# Patient Record
Sex: FEMALE | Race: BLACK OR AFRICAN AMERICAN
[De-identification: names, ages, dates, MRNs, and addresses within clinical notes are randomized per-mention and may not be internally consistent; named-entity substitution may affect disease eponyms.]

---

## 2020-11-08 ENCOUNTER — HOSPITAL ENCOUNTER (EMERGENCY)
Dept: HOSPITAL 72 - EMR | Age: 30
Discharge: HOME | End: 2020-11-08
Payer: SELF-PAY

## 2020-11-08 VITALS — SYSTOLIC BLOOD PRESSURE: 116 MMHG | DIASTOLIC BLOOD PRESSURE: 72 MMHG

## 2020-11-08 VITALS — WEIGHT: 170 LBS | HEIGHT: 67 IN | BODY MASS INDEX: 26.68 KG/M2

## 2020-11-08 DIAGNOSIS — Z88.5: ICD-10-CM

## 2020-11-08 DIAGNOSIS — M54.5: Primary | ICD-10-CM

## 2020-11-08 LAB
APPEARANCE UR: CLEAR
APTT PPP: (no result) S
GLUCOSE UR STRIP-MCNC: NEGATIVE MG/DL
KETONES UR QL STRIP: NEGATIVE
LEUKOCYTE ESTERASE UR QL STRIP: NEGATIVE
NITRITE UR QL STRIP: NEGATIVE
PH UR STRIP: 7 [PH] (ref 4.5–8)
PROT UR QL STRIP: NEGATIVE
SP GR UR STRIP: 1.01 (ref 1–1.03)
UROBILINOGEN UR-MCNC: 1 MG/DL (ref 0–1)

## 2020-11-08 PROCEDURE — 81003 URINALYSIS AUTO W/O SCOPE: CPT

## 2020-11-08 PROCEDURE — 99283 EMERGENCY DEPT VISIT LOW MDM: CPT

## 2020-11-08 PROCEDURE — 96372 THER/PROPH/DIAG INJ SC/IM: CPT

## 2020-11-08 PROCEDURE — 81025 URINE PREGNANCY TEST: CPT

## 2020-11-08 NOTE — EMERGENCY ROOM REPORT
History of Present Illness


General


Chief Complaint:  Lower Back Pain or Injury


Source:  Patient





Present Illness


HPI


Patient is a 30-year-old female presents for increased low back pain.  Reports 

having pain to the low back.  Denies any numbness or weakness to her 

extremities.  Reports having injury after lifting a box.  States that she felt a

pop.  Denies any difficulty with urination.  Denies any bowel or bladder 

dysfunction.  Pain is worse with flexion.  Denies any other locations of pain.  

Denies any problems with urination or any weakness to her extremities.  No 

recent fever.


Allergies:  


Coded Allergies:  


     NO KNOWN ALLERGIES (Verified  Allergy, Severe, 12/31/15)


     CODEINE (Unverified  Allergy, Unknown, 6/5/14)





COVID-19 Screening


Contact w/high risk pt:  No


Experienced COVID-19 symptoms?:  No


COVID-19 Testing performed PTA:  No





Patient History


Past Medical History:  see triage record


Last Menstrual Period:  11/2020


Reviewed Nursing Documentation:  PMH: Agreed; PSxH: Agreed





Nursing Documentation-PMH


Past Medical History:  No Stated History





Review of Systems


All Other Systems:  negative except mentioned in HPI





Physical Exam





Vital Signs








  Date Time  Temp Pulse Resp B/P (MAP) Pulse Ox O2 Delivery O2 Flow Rate FiO2


 


11/8/20 21:13 98.4 80 16 116/72 (87) 97 Room Air  








General Appearance:  well appearing, no apparent distress, alert, GCS 15


Head:  normocephalic, atraumatic


ENT:  hearing grossly normal, normal voice


Neck:  full range of motion, supple


Respiratory:  lungs clear, no rhonchi, no respiratory distress, speaking full 

sentences


Cardiovascular #1:  normal inspection, no edema


Gastrointestinal:  normal inspection, soft


Musculoskeletal:  decreased range of mation


Neurologic:  alert, motor strength/tone normal, CNs III-XII nml as tested, 

oriented x3, normal gait


Psychiatric:  normal inspection, mood/affect normal


Skin:  no rash





Medical Decision Making


Diagnostic Impression:  


   Primary Impression:  


   Back pain


ER Course


Patient presented for low back pain.  Differential diagnosis include was not 

limited to herniated disc, muscle spasm, zygapophyseal joint inflammation among 

others.  Patient has a benign exam and does not appear to require any imaging or

laboratory testing at this time.Patient symptoms and history is suggestive of 

disc herniation.  Patient does not appear to have any radicular symptoms or 

hyperreflexia consistent with any kind of a cauda equina syndrome or cord 

compression.  Patient was advised to follow-up with her primary care physician 

for recheck and to take medications as prescribed.  She is to return if worse.  

This medical record is generated with Dragon transcription software. There may 

be some transcription discrepancies related to use of this software





Labs








Test


 11/8/20


21:30


 


Urine Color Pale yellow 


 


Urine Appearance Clear 


 


Urine pH 7 (4.5-8.0) 


 


Urine Specific Gravity


 1.010


(1.005-1.035)


 


Urine Protein


 Negative


(NEGATIVE)


 


Urine Glucose (UA)


 Negative


(NEGATIVE)


 


Urine Ketones


 Negative


(NEGATIVE)


 


Urine Blood 3+ (NEGATIVE) 


 


Urine Nitrite


 Negative


(NEGATIVE)


 


Urine Bilirubin


 Negative


(NEGATIVE)


 


Urine Urobilinogen


 1 MG/DL


(0.0-1.0)


 


Urine Leukocyte Esterase


 Negative


(NEGATIVE)


 


Urine RBC


 5-10 /HPF (0 -


2)


 


Urine WBC


 0-2 /HPF (0 -


2)


 


Urine Squamous Epithelial


Cells Many /LPF


(NONE/OCC)


 


Urine Bacteria


 Few /HPF


(NONE)


 


Urine HCG, Qualitative


 Negative


(NEGATIVE)











Last Vital Signs








  Date Time  Temp Pulse Resp B/P (MAP) Pulse Ox O2 Delivery O2 Flow Rate FiO2


 


11/8/20 21:20 98.4 89 16 116/72 97 Room Air  








Status:  improved


Disposition:  HOME, SELF-CARE


Scripts


Methocarbamol* (ROBAXIN-500*) 500 Mg Tablet


500 MG ORAL TID PRN for For Pain, #15 TAB 0 Refills


   Prov: Kodak Bah MD         11/8/20 


Ibuprofen (Ibuprofen) 400 Mg Tablet


400 MG PO EVERY 8 HOURS, #30 TAB


   Prov: Kodak Bah MD         11/8/20


Referrals:  


NOT CHOSEN [facility]/MD,REFERRING (PCP)











Kodak Bah MD                Nov 8, 2020 21:48